# Patient Record
Sex: MALE | Race: WHITE | NOT HISPANIC OR LATINO | Employment: FULL TIME | URBAN - METROPOLITAN AREA
[De-identification: names, ages, dates, MRNs, and addresses within clinical notes are randomized per-mention and may not be internally consistent; named-entity substitution may affect disease eponyms.]

---
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
## 2023-03-22 ENCOUNTER — HOSPITAL ENCOUNTER (EMERGENCY)
Facility: HOSPITAL | Age: 23
Discharge: HOME | End: 2023-03-22
Attending: STUDENT IN AN ORGANIZED HEALTH CARE EDUCATION/TRAINING PROGRAM

## 2023-03-22 VITALS
RESPIRATION RATE: 18 BRPM | DIASTOLIC BLOOD PRESSURE: 67 MMHG | TEMPERATURE: 98 F | OXYGEN SATURATION: 98 % | HEART RATE: 75 BPM | HEIGHT: 73 IN | BODY MASS INDEX: 36.45 KG/M2 | WEIGHT: 275 LBS | SYSTOLIC BLOOD PRESSURE: 128 MMHG

## 2023-03-22 DIAGNOSIS — R11.2 NAUSEA AND VOMITING, UNSPECIFIED VOMITING TYPE: ICD-10-CM

## 2023-03-22 DIAGNOSIS — R19.7 DIARRHEA, UNSPECIFIED TYPE: ICD-10-CM

## 2023-03-22 DIAGNOSIS — K61.0 PERIANAL ABSCESS: ICD-10-CM

## 2023-03-22 DIAGNOSIS — L02.91 ABSCESS: Primary | ICD-10-CM

## 2023-03-22 PROCEDURE — 25000003 PHARM REV CODE 250: Performed by: STUDENT IN AN ORGANIZED HEALTH CARE EDUCATION/TRAINING PROGRAM

## 2023-03-22 PROCEDURE — 46050 I&D PERIANAL ABSCESS SUPFC: CPT

## 2023-03-22 PROCEDURE — 99283 EMERGENCY DEPT VISIT LOW MDM: CPT

## 2023-03-22 RX ORDER — OXYCODONE AND ACETAMINOPHEN 5; 325 MG/1; MG/1
1 TABLET ORAL
Status: COMPLETED | OUTPATIENT
Start: 2023-03-22 | End: 2023-03-22

## 2023-03-22 RX ORDER — AMOXICILLIN AND CLAVULANATE POTASSIUM 875; 125 MG/1; MG/1
1 TABLET, FILM COATED ORAL 2 TIMES DAILY
Qty: 10 TABLET | Refills: 0 | Status: SHIPPED | OUTPATIENT
Start: 2023-03-22 | End: 2023-03-27

## 2023-03-22 RX ORDER — AMOXICILLIN AND CLAVULANATE POTASSIUM 875; 125 MG/1; MG/1
1 TABLET, FILM COATED ORAL
Status: COMPLETED | OUTPATIENT
Start: 2023-03-22 | End: 2023-03-22

## 2023-03-22 RX ORDER — LIDOCAINE HYDROCHLORIDE AND EPINEPHRINE 10; 10 MG/ML; UG/ML
5 INJECTION, SOLUTION INFILTRATION; PERINEURAL ONCE
Status: COMPLETED | OUTPATIENT
Start: 2023-03-22 | End: 2023-03-22

## 2023-03-22 RX ORDER — ONDANSETRON 4 MG/1
4 TABLET, FILM COATED ORAL EVERY 6 HOURS
Qty: 12 TABLET | Refills: 0 | Status: SHIPPED | OUTPATIENT
Start: 2023-03-22

## 2023-03-22 RX ORDER — ONDANSETRON 4 MG/1
4 TABLET, ORALLY DISINTEGRATING ORAL
Status: COMPLETED | OUTPATIENT
Start: 2023-03-22 | End: 2023-03-22

## 2023-03-22 RX ADMIN — OXYCODONE AND ACETAMINOPHEN 1 TABLET: 5; 325 TABLET ORAL at 08:03

## 2023-03-22 RX ADMIN — ONDANSETRON 4 MG: 4 TABLET, ORALLY DISINTEGRATING ORAL at 08:03

## 2023-03-22 RX ADMIN — AMOXICILLIN AND CLAVULANATE POTASSIUM 1 TABLET: 875; 125 TABLET, FILM COATED ORAL at 09:03

## 2023-03-22 RX ADMIN — LIDOCAINE HYDROCHLORIDE AND EPINEPHRINE 5 ML: 10; 10 INJECTION, SOLUTION INFILTRATION; PERINEURAL at 08:03

## 2023-03-23 NOTE — ED PROVIDER NOTES
Encounter Date: 3/22/2023       History     Chief Complaint   Patient presents with    Abscess     Pt has abscess in anal area and states it has bust already but he has started having fever and diarrhea since Friday.      22-year-old male presents for evaluation of a draining gluteal abscess.  Since Friday, patient has had intermittent nausea vomiting and diarrhea which is nonbloody and nonbiliousalong with subjective fevers.  He has been able to tolerate p.o. intake over the past 2 days ago but noticed the skin of his glutes was a little raw.  He subsequently developed pain redness and swelling of the right glute a few inches from the anal verge.  His wife examined it today and it began to express purulent drainage.  Pain has since improved a little bit.    Review of patient's allergies indicates:  No Known Allergies  Past Medical History:   Diagnosis Date    Allergy     Renal stone      Past Surgical History:   Procedure Laterality Date    PILONIDAL CYST DRAINAGE       No family history on file.  Social History     Tobacco Use    Smoking status: Never   Substance Use Topics    Alcohol use: No    Drug use: No     Review of Systems   Constitutional:  Positive for fever. Negative for activity change and appetite change.   HENT:  Negative for congestion and drooling.    Eyes:  Negative for discharge and itching.   Respiratory:  Negative for cough and chest tightness.    Cardiovascular:  Negative for chest pain and leg swelling.   Gastrointestinal:  Positive for nausea and vomiting. Negative for abdominal distention and abdominal pain.   Genitourinary:  Negative for difficulty urinating and dysuria.   Musculoskeletal:  Negative for arthralgias.   Skin:  Negative for color change and pallor.   Neurological:  Negative for dizziness and facial asymmetry.   Psychiatric/Behavioral:  Negative for agitation and behavioral problems.      Physical Exam     Initial Vitals [03/22/23 1935]   BP Pulse Resp Temp SpO2   (!) 177/97  103 18 98.5 °F (36.9 °C) 96 %      MAP       --         Physical Exam    Nursing note and vitals reviewed.  Constitutional: He appears well-developed and well-nourished.   HENT:   Head: Normocephalic and atraumatic.   Mouth/Throat: Oropharynx is clear and moist.   Eyes: Conjunctivae and EOM are normal. Pupils are equal, round, and reactive to light.   Neck: No thyromegaly present.   Normal range of motion.  Cardiovascular:  Normal rate, regular rhythm and intact distal pulses.           Pulmonary/Chest: Breath sounds normal. No respiratory distress. He has no wheezes.   Abdominal: Abdomen is soft. Bowel sounds are normal. He exhibits no distension. There is no abdominal tenderness.   Genitourinary:    Genitourinary Comments: Draining gluteal abscess with surrounding induration and tenderness to palpation approximately 3 in from the anal verge         Musculoskeletal:         General: No tenderness or edema. Normal range of motion.      Cervical back: Normal range of motion.     Neurological: He is alert and oriented to person, place, and time. He has normal strength. No cranial nerve deficit.   Skin: Skin is warm and dry. No rash noted.   Psychiatric: He has a normal mood and affect. His behavior is normal. Thought content normal.       ED Course   I & D - Incision and Drainage    Date/Time: 3/22/2023 8:48 PM  Location procedure was performed: [facility] EMERGENCY DEPARTMENT  Performed by: Vinod Brand MD  Authorized by: Vinod Brand MD   Type: abscess  Body area: anogenital  Location details: perianal  Anesthesia: local infiltration    Anesthesia:  Local Anesthetic: lidocaine 1% with epinephrine  Scalpel size: 11  Incision type: single straight  Incision depth: dermal  Complexity: simple  Drainage: serosanguinous and pus  Drainage amount: moderate  Wound treatment: incision, drainage, wound left open and expression of material  Complications: No  Specimens: No  Implants: No    Incision depth: dermal      Labs  Reviewed - No data to display       Imaging Results    None          Medications   LIDOcaine-EPINEPHrine 1%-1:100,000 injection 5 mL (5 mLs Intradermal Given 3/22/23 2030)   oxyCODONE-acetaminophen 5-325 mg per tablet 1 tablet (1 tablet Oral Given 3/22/23 2015)   ondansetron disintegrating tablet 4 mg (4 mg Oral Given 3/22/23 2015)   amoxicillin-clavulanate 875-125mg per tablet 1 tablet (1 tablet Oral Given 3/22/23 2158)                            History is consistent with spontaneously draining abscess in the setting of post viral nausea vomiting and diarrhea.  Exam consistent with perianal abscess, which was spontaneously draining purulent material.  Incision and drainage completed at bedside under local anesthesia with approximately 4-5 mL of further purulent foul-smelling drainage.  Patient did have some tenderness to palpation midline on the anal verge but no fluctuance or tenderness to palpation internally in the rectum, lessening concern for fistula or perirectal abscess.  Patient is nontoxic-appearing, doubt sepsis.  He is afebrile.  He is tolerating p.o. intake.  Will place patient on Augmentin and schedule close outpatient follow-up with his general surgeon, Dr. Adams, along with outpatient follow-up with colorectal surgery if symptoms persist.  Advised him to return if he is having worsening pain, fevers chills nausea or vomiting.  He is comfortable with the plan.  Stable for discharge with outpatient follow-up.      Clinical Impression:   Final diagnoses:  [L02.91] Abscess (Primary)  [R11.2] Nausea and vomiting, unspecified vomiting type  [R19.7] Diarrhea, unspecified type  [K61.0] Perianal abscess        ED Disposition Condition    Discharge Stable          ED Prescriptions       Medication Sig Dispense Start Date End Date Auth. Provider    amoxicillin-clavulanate 875-125mg (AUGMENTIN) 875-125 mg per tablet Take 1 tablet by mouth 2 (two) times daily. for 5 days 10 tablet 3/22/2023 3/27/2023 Vinod SAINZ  MD Sunday    ondansetron (ZOFRAN) 4 MG tablet Take 1 tablet (4 mg total) by mouth every 6 (six) hours. 12 tablet 3/22/2023 -- Vinod Brand MD          Follow-up Information       Follow up With Specialties Details Why Contact Info    Quinten Pierson MD General Surgery, Surgery Call on 3/23/2023 To set up a follow-up appointment, To recheck today's symptoms [address]  [address]  [place] [id number]  [phone]               Vinod Brand MD  03/23/23 0045

## 2023-03-23 NOTE — ED NOTES
Provider at bedside for I&D of perianal abscess.  This nurse present.  Pt tolerated with mild discomfort.  SO at bedside.

## 2023-03-23 NOTE — DISCHARGE INSTRUCTIONS

## 2023-06-05 ENCOUNTER — TELEPHONE (OUTPATIENT)
Dept: FAMILY MEDICINE | Facility: CLINIC | Age: 23
End: 2023-06-05

## 2023-11-08 ENCOUNTER — HOSPITAL ENCOUNTER (EMERGENCY)
Facility: HOSPITAL | Age: 23
Discharge: HOME | End: 2023-11-08
Attending: STUDENT IN AN ORGANIZED HEALTH CARE EDUCATION/TRAINING PROGRAM

## 2023-11-08 VITALS
DIASTOLIC BLOOD PRESSURE: 74 MMHG | HEIGHT: 73 IN | OXYGEN SATURATION: 96 % | TEMPERATURE: 97 F | WEIGHT: 275 LBS | BODY MASS INDEX: 36.45 KG/M2 | RESPIRATION RATE: 20 BRPM | HEART RATE: 79 BPM | SYSTOLIC BLOOD PRESSURE: 130 MMHG

## 2023-11-08 DIAGNOSIS — N20.1 URETEROLITHIASIS: Primary | ICD-10-CM

## 2023-11-08 DIAGNOSIS — N50.812 PAIN IN LEFT TESTICLE: ICD-10-CM

## 2023-11-08 LAB
ALBUMIN SERPL BCP-MCNC: 4.5 G/DL (ref 3.5–5.2)
ALP SERPL-CCNC: 66 U/L (ref 55–135)
ALT SERPL W/O P-5'-P-CCNC: 25 U/L (ref 10–44)
ANION GAP SERPL CALC-SCNC: 12 MMOL/L (ref 8–16)
AST SERPL-CCNC: 17 U/L (ref 10–40)
BACTERIA #/AREA URNS HPF: NEGATIVE /HPF
BASOPHILS # BLD AUTO: 0.02 K/UL (ref 0–0.2)
BASOPHILS NFR BLD: 0.2 % (ref 0–1.9)
BILIRUB SERPL-MCNC: 0.9 MG/DL (ref 0.1–1)
BILIRUB UR QL STRIP: NEGATIVE
BUN SERPL-MCNC: 13 MG/DL (ref 6–20)
CALCIUM SERPL-MCNC: 9.7 MG/DL (ref 8.7–10.5)
CHLORIDE SERPL-SCNC: 104 MMOL/L (ref 95–110)
CLARITY UR: CLEAR
CO2 SERPL-SCNC: 21 MMOL/L (ref 23–29)
COLOR UR: YELLOW
CREAT SERPL-MCNC: 0.9 MG/DL (ref 0.5–1.4)
CREAT SERPL-MCNC: 1 MG/DL (ref 0.5–1.4)
DIFFERENTIAL METHOD: ABNORMAL
EOSINOPHIL # BLD AUTO: 0.2 K/UL (ref 0–0.5)
EOSINOPHIL NFR BLD: 2.2 % (ref 0–8)
ERYTHROCYTE [DISTWIDTH] IN BLOOD BY AUTOMATED COUNT: 11.7 % (ref 11.5–14.5)
EST. GFR  (NO RACE VARIABLE): >60 ML/MIN/1.73 M^2
GLUCOSE SERPL-MCNC: 109 MG/DL (ref 70–110)
GLUCOSE UR QL STRIP: NEGATIVE
HCT VFR BLD AUTO: 45.3 % (ref 40–54)
HGB BLD-MCNC: 16.8 G/DL (ref 14–18)
HGB UR QL STRIP: ABNORMAL
HYALINE CASTS #/AREA URNS LPF: 1 /LPF
IMM GRANULOCYTES # BLD AUTO: 0.02 K/UL (ref 0–0.04)
IMM GRANULOCYTES NFR BLD AUTO: 0.2 % (ref 0–0.5)
KETONES UR QL STRIP: ABNORMAL
LEUKOCYTE ESTERASE UR QL STRIP: NEGATIVE
LYMPHOCYTES # BLD AUTO: 1.6 K/UL (ref 1–4.8)
LYMPHOCYTES NFR BLD: 19.8 % (ref 18–48)
MCH RBC QN AUTO: 32.2 PG (ref 27–31)
MCHC RBC AUTO-ENTMCNC: 37.1 G/DL (ref 32–36)
MCV RBC AUTO: 87 FL (ref 82–98)
MICROSCOPIC COMMENT: ABNORMAL
MONOCYTES # BLD AUTO: 0.5 K/UL (ref 0.3–1)
MONOCYTES NFR BLD: 5.9 % (ref 4–15)
NEUTROPHILS # BLD AUTO: 5.8 K/UL (ref 1.8–7.7)
NEUTROPHILS NFR BLD: 71.7 % (ref 38–73)
NITRITE UR QL STRIP: NEGATIVE
NRBC BLD-RTO: 0 /100 WBC
PH UR STRIP: >8 [PH] (ref 5–8)
PLATELET # BLD AUTO: 234 K/UL (ref 150–450)
PMV BLD AUTO: 9.4 FL (ref 9.2–12.9)
POTASSIUM SERPL-SCNC: 3.4 MMOL/L (ref 3.5–5.1)
PROT SERPL-MCNC: 7.7 G/DL (ref 6–8.4)
PROT UR QL STRIP: ABNORMAL
RBC # BLD AUTO: 5.21 M/UL (ref 4.6–6.2)
RBC #/AREA URNS HPF: 78 /HPF (ref 0–4)
SAMPLE: NORMAL
SODIUM SERPL-SCNC: 137 MMOL/L (ref 136–145)
SP GR UR STRIP: 1.02 (ref 1–1.03)
SQUAMOUS #/AREA URNS HPF: 1 /HPF
URN SPEC COLLECT METH UR: ABNORMAL
UROBILINOGEN UR STRIP-ACNC: NEGATIVE EU/DL
WBC # BLD AUTO: 8.13 K/UL (ref 3.9–12.7)
WBC #/AREA URNS HPF: 2 /HPF (ref 0–5)

## 2023-11-08 PROCEDURE — 80053 COMPREHEN METABOLIC PANEL: CPT | Performed by: NURSE PRACTITIONER

## 2023-11-08 PROCEDURE — 82565 ASSAY OF CREATININE: CPT

## 2023-11-08 PROCEDURE — 96361 HYDRATE IV INFUSION ADD-ON: CPT

## 2023-11-08 PROCEDURE — 96375 TX/PRO/DX INJ NEW DRUG ADDON: CPT

## 2023-11-08 PROCEDURE — 81001 URINALYSIS AUTO W/SCOPE: CPT | Performed by: NURSE PRACTITIONER

## 2023-11-08 PROCEDURE — 96374 THER/PROPH/DIAG INJ IV PUSH: CPT

## 2023-11-08 PROCEDURE — 25500020 PHARM REV CODE 255: Performed by: STUDENT IN AN ORGANIZED HEALTH CARE EDUCATION/TRAINING PROGRAM

## 2023-11-08 PROCEDURE — 63600175 PHARM REV CODE 636 W HCPCS: Performed by: STUDENT IN AN ORGANIZED HEALTH CARE EDUCATION/TRAINING PROGRAM

## 2023-11-08 PROCEDURE — 25000003 PHARM REV CODE 250: Performed by: STUDENT IN AN ORGANIZED HEALTH CARE EDUCATION/TRAINING PROGRAM

## 2023-11-08 PROCEDURE — 99285 EMERGENCY DEPT VISIT HI MDM: CPT | Mod: 25

## 2023-11-08 PROCEDURE — 85025 COMPLETE CBC W/AUTO DIFF WBC: CPT | Performed by: NURSE PRACTITIONER

## 2023-11-08 PROCEDURE — 63600175 PHARM REV CODE 636 W HCPCS: Performed by: NURSE PRACTITIONER

## 2023-11-08 RX ORDER — ONDANSETRON 2 MG/ML
4 INJECTION INTRAMUSCULAR; INTRAVENOUS
Status: COMPLETED | OUTPATIENT
Start: 2023-11-08 | End: 2023-11-08

## 2023-11-08 RX ORDER — TAMSULOSIN HYDROCHLORIDE 0.4 MG/1
0.4 CAPSULE ORAL
Status: COMPLETED | OUTPATIENT
Start: 2023-11-08 | End: 2023-11-08

## 2023-11-08 RX ORDER — OXYCODONE HYDROCHLORIDE 5 MG/1
5 TABLET ORAL ONCE
Status: COMPLETED | OUTPATIENT
Start: 2023-11-08 | End: 2023-11-08

## 2023-11-08 RX ORDER — TAMSULOSIN HYDROCHLORIDE 0.4 MG/1
0.4 CAPSULE ORAL DAILY
Qty: 14 CAPSULE | Refills: 0 | Status: SHIPPED | OUTPATIENT
Start: 2023-11-08 | End: 2023-11-22

## 2023-11-08 RX ORDER — HYDROMORPHONE HYDROCHLORIDE 1 MG/ML
0.5 INJECTION, SOLUTION INTRAMUSCULAR; INTRAVENOUS; SUBCUTANEOUS
Status: COMPLETED | OUTPATIENT
Start: 2023-11-08 | End: 2023-11-08

## 2023-11-08 RX ORDER — OXYCODONE HYDROCHLORIDE 5 MG/1
5 TABLET ORAL EVERY 6 HOURS PRN
Qty: 8 TABLET | Refills: 0 | Status: SHIPPED | OUTPATIENT
Start: 2023-11-08

## 2023-11-08 RX ORDER — ACETAMINOPHEN 500 MG
1000 TABLET ORAL
Status: COMPLETED | OUTPATIENT
Start: 2023-11-08 | End: 2023-11-08

## 2023-11-08 RX ADMIN — ONDANSETRON 4 MG: 2 INJECTION INTRAMUSCULAR; INTRAVENOUS at 11:11

## 2023-11-08 RX ADMIN — HYDROMORPHONE HYDROCHLORIDE 0.5 MG: 0.5 INJECTION, SOLUTION INTRAMUSCULAR; INTRAVENOUS; SUBCUTANEOUS at 11:11

## 2023-11-08 RX ADMIN — IOHEXOL 100 ML: 350 INJECTION, SOLUTION INTRAVENOUS at 12:11

## 2023-11-08 RX ADMIN — TAMSULOSIN HYDROCHLORIDE 0.4 MG: 0.4 CAPSULE ORAL at 01:11

## 2023-11-08 RX ADMIN — SODIUM CHLORIDE, SODIUM LACTATE, POTASSIUM CHLORIDE, AND CALCIUM CHLORIDE 1000 ML: .6; .31; .03; .02 INJECTION, SOLUTION INTRAVENOUS at 11:11

## 2023-11-08 RX ADMIN — OXYCODONE HYDROCHLORIDE 5 MG: 5 TABLET ORAL at 01:11

## 2023-11-08 RX ADMIN — ACETAMINOPHEN 1000 MG: 500 TABLET ORAL at 01:11

## 2023-11-08 NOTE — ED NOTES
Provided patient with urine collection and strainer system. Instructed on the process of looking for stones. Verbalized understanding.

## 2023-11-08 NOTE — DISCHARGE INSTRUCTIONS
Drink plenty of water until your urine is a light clear yellow color. If urine is coming out clear, drink less. If urine coming out dark yellow drink more.     For pain take the flomax prescribed and  1000mg Tylenol every 8 hours and 400 mg of ibuprofen every hours.  The next time you can take these medications are around 10:30pm.  If your pain is not controlled with these medications then you can take one of the oxycodone pills prescribed to you today. Try to only take these oxycodone if you have to as they have the ability to make your body reliant on them.     For nausea you have been prescribed zofran, take as prescribed.     You need to see a primary care doctor within 3 days for follow up who will follow along with your symptoms and pain. You have been referred to a urologist as well to discuss ureter stones and how to prevent them

## 2023-11-08 NOTE — ED PROVIDER NOTES
Encounter Date: 11/8/2023       History     Chief Complaint   Patient presents with    Flank Pain     LEFT , ONSET THIS AM    Nausea    Vomiting     HPI  22 yo presenting with sudden onset LLQ abd pain radiating to L testicle this morning. Was feeling fine yestrday.  No known medical problems per patient but from EMR patient has history of renal stone.  Patient reports radiation of the left lower quadrant abdominal pain to the left testicle.  Associated with nausea, vomiting and feeling that is difficult to urinate although was able to urinate a few hours ago.  No hematuria, dysuria, urinary frequency, no diarrhea, constipation.  No history of abdominal surgeries.  Review of patient's allergies indicates:  No Known Allergies  Past Medical History:   Diagnosis Date    Allergy     Renal stone      Past Surgical History:   Procedure Laterality Date    PILONIDAL CYST DRAINAGE       No family history on file.  Social History     Tobacco Use    Smoking status: Never   Substance Use Topics    Alcohol use: No    Drug use: No     Review of Systems   Constitutional:  Negative for chills and fever.   HENT:  Negative for congestion and sore throat.    Respiratory:  Negative for cough and shortness of breath.    Cardiovascular:  Negative for chest pain.   Gastrointestinal:  Positive for abdominal pain, nausea and vomiting. Negative for anal bleeding, blood in stool, constipation and diarrhea.   Genitourinary:  Positive for difficulty urinating and testicular pain. Negative for decreased urine volume, dysuria, flank pain, frequency, hematuria, penile discharge, penile pain, penile swelling, scrotal swelling and urgency.   Musculoskeletal:  Negative for back pain.       Physical Exam     Initial Vitals [11/08/23 1102]   BP Pulse Resp Temp SpO2   129/87 107 (!) 22 97.4 °F (36.3 °C) 98 %      MAP       --         Physical Exam    Nursing note and vitals reviewed.  Constitutional: He appears well-developed and well-nourished. He is  not diaphoretic.   Answering questions in full sentences without increased work of breathing but appears uncomfortable and groaning in pain intermittently   HENT:   Head: Normocephalic.   Eyes: Right eye exhibits no discharge. Left eye exhibits no discharge. No scleral icterus.   Neck: Neck supple. No tracheal deviation present.   Normal range of motion.  Cardiovascular:  Normal rate and regular rhythm.           Pulmonary/Chest: No stridor. No respiratory distress. He has no wheezes. He has no rhonchi. He has no rales. He exhibits no tenderness.   Abdominal: Abdomen is soft. There is no abdominal tenderness. There is no rebound and no guarding.   Genitourinary:    Genitourinary Comments: Genital exam completed with nurse chaperone.  Circumcised penis with no tenderness to palpation.  Testicles appear symmetric, well-perfused, no tenderness to palpation but patient reports pain with palpation of the left testicle.  Cremasteric reflex intact bilaterally.  No hernia palpated in the inguinal canal     Musculoskeletal:         General: No edema.      Cervical back: Normal range of motion and neck supple.     Neurological: He is alert and oriented to person, place, and time.   Moving all extremities   Skin: Skin is warm and dry.         ED Course   Procedures  Labs Reviewed   CBC W/ AUTO DIFFERENTIAL - Abnormal; Notable for the following components:       Result Value    MCH 32.2 (*)     MCHC 37.1 (*)     All other components within normal limits   COMPREHENSIVE METABOLIC PANEL - Abnormal; Notable for the following components:    Potassium 3.4 (*)     CO2 21 (*)     All other components within normal limits   URINALYSIS, REFLEX TO URINE CULTURE - Abnormal; Notable for the following components:    pH, UA >8.0 (*)     Protein, UA Trace (*)     Ketones, UA 1+ (*)     Occult Blood UA 2+ (*)     All other components within normal limits    Narrative:     Specimen Source->Urine   URINALYSIS MICROSCOPIC - Abnormal; Notable for  the following components:    RBC, UA 78 (*)     All other components within normal limits    Narrative:     Specimen Source->Urine   ISTAT CREATININE          Imaging Results              CT Abdomen Pelvis With IV Contrast (Final result)  Result time 11/08/23 12:58:14      Final result by Jason Lazo MD (11/08/23 12:58:14)                   Narrative:    CT ABDOMEN AND PELVIS WITH CONTRAST    HISTORY: Left lower quadrant pain    CMS MANDATED QUALITY DATA - CT RADIATION  436    All CT scans at this facility utilize dose modulation, iterative reconstruction, and/or weight based dosing when appropriate to reduce radiation dose to as low as reasonably achievable    FINDINGS:    Post infusion images were obtained from the lung bases to the pubic symphysis. 100 cc nonionic contrast was administered for the examination.    The lung bases are normal.    The liver has a normal appearance with the exception of mild hepatic steatosis. The gallbladder and biliary tree are unremarkable. The spleen, pancreas, and adrenal glands are normal.    The right kidney is normal. The left kidney has slight hypoenhancement throughout, with mild left-sided hydronephrosis. There is hydroureter to the ureterovesicular junction, where a 6 mm oblong calculus is noted.    The abdominal aorta is normal in caliber. Bowel structures are within normal limits. There is no free air, free fluid, or lymphadenopathy.    Images of the pelvis demonstrate a normal rectosigmoid colon. The urinary bladder is unremarkable. There is no free fluid.    No acute osseous abnormalities are identified.    IMPRESSION:      1. 6 mm left ureterovesicular junction calculus with mild hydronephrosis.  2. Mild hepatic steatosis.    Electronically signed by:  Jason Lazo MD  11/08/2023 12:58 PM [facility] Workstation: [id number]                                     US Scrotum And Testicles (Final result)  Result time 11/08/23 12:54:06      Final result by Barbara  MD Cecil (11/08/23 12:54:06)                   Narrative:    REASON: Left testicular pain.    TECHNIQUE: Grayscale and color Doppler ultrasound of the scrotum and testicles.    COMPARISON: None.    FINDINGS:    Right testicle measures 5.0 x 2.8 x 3.3 cm. Left testicle measures 5.0 x 2.8 x 3.0 cm. Normal vascularity of the testicles demonstrated. No testicular mass or lesion.    A right epididymal head cyst versus spermatocele is noted measuring 0.8 x 0.5 cm. Small left hydrocele noted.    IMPRESSION:    1.  Small left hydrocele.  2.  0.8 x 0.5 cm right epididymal head cysts.    Electronically signed by:  Cecil Jimenez DO  11/08/2023 12:54 PM CST Workstation: [id number]                                     Medications   ondansetron injection 4 mg (4 mg Intravenous Given 11/8/23 1130)   HYDROmorphone injection 0.5 mg (0.5 mg Intravenous Given 11/8/23 1129)   lactated ringers bolus 1,000 mL (0 mLs Intravenous Stopped 11/8/23 1315)   iohexoL (OMNIPAQUE 350) injection 100 mL (100 mLs Intravenous Given 11/8/23 1226)   acetaminophen tablet 1,000 mg (1,000 mg Oral Given 11/8/23 1351)   oxyCODONE immediate release tablet 5 mg (5 mg Oral Given 11/8/23 1351)   tamsulosin 24 hr capsule 0.4 mg (0.4 mg Oral Given 11/8/23 1351)     Medical Decision Making  Amount and/or Complexity of Data Reviewed  Radiology: ordered.    Risk  OTC drugs.  Prescription drug management.    23-year-old man presenting with left lower abdominal pain, sudden onset this morning, persistent.  Associated with nausea, vomiting, decreased urination, radiation to the left testicle.    Differential includes testicular torsion, diverticulitis, appendicitis, ureterolithiasis, urinary retention     Patient appears uncomfortable.  Treating with Dilaudid, Zofran.  On exam patient with left lower quadrant tenderness but also reporting radiation to the left testicle and pain with palpation to the left testicle therefore both CT abdomen and pelvis and testicular  ultrasound ordered to evaluate patient's sudden onset pain.  Left testicle does not obviously appear torsed therefore no bedside manual rotation completed at this time.  Patient reports he was able to urinate this morning but thinks it was less than normal and thinks that it was difficult to urinate therefore bladder scan will be completed as well.  Pending workup.   Denise Clark MD  Emergency Medicine Staff Physician  11:24 AM     UPDATE:   Patient with 6 mm ureteral stone at the UVJ with L mild hydro.  Urinalysis does not indicate infection.  Patient feeling much improved.  Prescribed Flomax.  Patient reports he has Zofran at home.  Discussed pain control with Tylenol, ibuprofen and if breakthrough pain then oxycodone prescribed.  This is patient's 4th kidney stone therefore referred to Urology and also discussed close primary care follow up in the next few days.  Discussed strict return precautions. Discussed proper hydrationl  Denise Clark MD  Emergency Medicine Staff Physician  2:05 PM                                Clinical Impression:   Final diagnoses:  [N50.812] Pain in left testicle  [N20.1] Ureterolithiasis (Primary)        ED Disposition Condition    Discharge Stable          ED Prescriptions       Medication Sig Dispense Start Date End Date Auth. Provider    oxyCODONE (ROXICODONE) 5 MG immediate release tablet Take 1 tablet (5 mg total) by mouth every 6 (six) hours as needed for Pain. 8 tablet 11/8/2023 -- Denise Clark MD    tamsulosin (FLOMAX) 0.4 mg Cap Take 1 capsule (0.4 mg total) by mouth once daily. for 14 days 14 capsule 11/8/2023 11/22/2023 Denise Clark MD          Follow-up Information       Follow up With Specialties Details Why Contact Info Additional Information    [facility] - Emergency Dept Emergency Medicine Go to  Return to an emergency department immediately if you develop persisting or worsening symptoms or with any new symptoms such as fevers, chills,  inability to eat or drink, uncontrollable pain, headaches, chagnes in vision, nausea, vomiting, stomach pain [address] [facility] [address] [phone] 1st floor             Denise Clark MD  11/09/23 6218

## 2023-11-09 ENCOUNTER — TELEPHONE (OUTPATIENT)
Dept: FAMILY MEDICINE | Facility: CLINIC | Age: 23
End: 2023-11-09

## (undated) DEVICE — SEE MEDLINE ITEM 146292

## (undated) DEVICE — SLEEVE SCD EXPRESS CALF MEDIUM

## (undated) DEVICE — SCRUB 10% POVIDONE IODINE 4OZ

## (undated) DEVICE — SOL PVP-I SCRUB 7.5% 4OZ

## (undated) DEVICE — SUT PROLENE 4-0 MONO 18IN

## (undated) DEVICE — STRAP OR TABLE 5IN X 72IN

## (undated) DEVICE — SEE MEDLINE ITEM 157117

## (undated) DEVICE — SUT 3-0 VICRYL / SH (J416)

## (undated) DEVICE — SUT ETHILON 4-0 PS2 18 BLK

## (undated) DEVICE — GLOVE SURG ULTRA TOUCH 7.5

## (undated) DEVICE — SYR 10CC LUER LOCK

## (undated) DEVICE — GAUZE SPONGE BULKEE 6X6.75IN

## (undated) DEVICE — ELECTRODE REM PLYHSV RETURN 9

## (undated) DEVICE — DRAPE MINOR PROCEDURE

## (undated) DEVICE — NDL SAFETY 21G X 1 1/2 ECLPSE

## (undated) DEVICE — PACK CUSTOM UNIV BASIN SLI

## (undated) DEVICE — DRESSING TRANS 6X8 TEGADERM

## (undated) DEVICE — TRAY DRY SPONGE SCRUB W FOAM